# Patient Record
Sex: FEMALE | Race: ASIAN | NOT HISPANIC OR LATINO | URBAN - METROPOLITAN AREA
[De-identification: names, ages, dates, MRNs, and addresses within clinical notes are randomized per-mention and may not be internally consistent; named-entity substitution may affect disease eponyms.]

---

## 2023-09-29 ENCOUNTER — EMERGENCY (EMERGENCY)
Facility: HOSPITAL | Age: 58
LOS: 1 days | Discharge: HOME | End: 2023-09-29
Attending: STUDENT IN AN ORGANIZED HEALTH CARE EDUCATION/TRAINING PROGRAM | Admitting: STUDENT IN AN ORGANIZED HEALTH CARE EDUCATION/TRAINING PROGRAM
Payer: MEDICAID

## 2023-09-29 VITALS
RESPIRATION RATE: 16 BRPM | SYSTOLIC BLOOD PRESSURE: 126 MMHG | DIASTOLIC BLOOD PRESSURE: 61 MMHG | TEMPERATURE: 98 F | OXYGEN SATURATION: 100 % | HEART RATE: 69 BPM

## 2023-09-29 PROCEDURE — 99284 EMERGENCY DEPT VISIT MOD MDM: CPT

## 2023-09-29 RX ORDER — ACETAMINOPHEN 500 MG
1000 TABLET ORAL ONCE
Refills: 0 | Status: COMPLETED | OUTPATIENT
Start: 2023-09-29 | End: 2023-09-29

## 2023-09-29 RX ORDER — SODIUM CHLORIDE 9 MG/ML
1000 INJECTION INTRAMUSCULAR; INTRAVENOUS; SUBCUTANEOUS ONCE
Refills: 0 | Status: COMPLETED | OUTPATIENT
Start: 2023-09-29 | End: 2023-09-29

## 2023-09-29 RX ORDER — METOCLOPRAMIDE HCL 10 MG
10 TABLET ORAL ONCE
Refills: 0 | Status: COMPLETED | OUTPATIENT
Start: 2023-09-29 | End: 2023-09-29

## 2023-09-29 RX ADMIN — SODIUM CHLORIDE 1000 MILLILITER(S): 9 INJECTION INTRAMUSCULAR; INTRAVENOUS; SUBCUTANEOUS at 14:31

## 2023-09-29 RX ADMIN — Medication 10 MILLIGRAM(S): at 14:31

## 2023-09-29 RX ADMIN — Medication 400 MILLIGRAM(S): at 14:31

## 2023-09-29 NOTE — ED PROVIDER NOTE - NS ED ATTENDING STATEMENT MOD
This was a shared visit with the NURA. I reviewed and verified the documentation and independently performed the documented:

## 2023-09-29 NOTE — ED PROVIDER NOTE - PATIENT PORTAL LINK FT
You can access the FollowMyHealth Patient Portal offered by [facility] by registering at the following website: [email or web address]. By joining [employer]’s FollowMyHealth portal, you will also be able to view your health information using other applications (apps) compatible with our system.

## 2023-09-29 NOTE — ED ADULT NURSE NOTE - OBJECTIVE STATEMENT
Patient came in with the complaints of Headache. As per Daughter at bedside, patient took Motrin with little relief. No other complaints. Medications given as ordered. Patient tolerated well. Nursing care continues

## 2023-09-29 NOTE — ED PROVIDER NOTE - CLINICAL SUMMARY MEDICAL DECISION MAKING FREE TEXT BOX
58 yo F presents for HA. stable VS and benign neuro exam with no other current complaints. Tylenol, fluids, and Reglan and reassess. DC home with neuro f/u. Corrine Sanchez DO PGY1

## 2023-09-29 NOTE — ED PROVIDER NOTE - ATTENDING APP SHARED VISIT CONTRIBUTION OF CARE
I ([place]) agree with above, I performed a history and physical. Counseled xiomy medical staff, physician assistant, and/or medical student on medical decision making as documented. Medical decisions and treatment interventions were made in real time during the patient encounter. Additionally and/or with the following exceptions: 57-year-old past medical history hypertension hyperlipidemia who is presenting to the emergency department with headache.  Started yesterday.  Has had this headache before.  Patient is neurologically intact 5 out of 5 in all extremities, well-appearing, no nuchal rigidity, ambulatory without ataxia.  Patient felt better after acetaminophen and Reglan.  Was stable for DC home with neurology follow-up

## 2023-09-29 NOTE — ED PROVIDER NOTE - PHYSICAL EXAMINATION
Corrine Sanchez DO (PGY1)   Physical Exam:    Gen: NAD, AOx3, non-toxic appearing, able to ambulate without assistance  Lung: CTAB, no respiratory distress, no wheezes/rhonchi/rales B/L  CV: RRR, no murmurs, rubs or gallops  Abd: soft, NT, ND, no guarding, no rigidity, no rebound tenderness, no CVA tenderness   Neuro: CN2-12 grossly intact, A&Ox4, MS +5/5 in UE and LE BL, finger to nose smooth and rapid, gross sensation intact in UE and LE BL, gait smooth and coordinated

## 2023-09-29 NOTE — ED ADULT NURSE NOTE - NSFALLUNIVINTERV_ED_ALL_ED
Bed/Stretcher in lowest position, wheels locked, appropriate side rails in place/Call bell, personal items and telephone in reach/Instruct patient to call for assistance before getting out of bed/chair/stretcher/Non-slip footwear applied when patient is off stretcher/[place] to call system/Physically safe environment - no spills, clutter or unnecessary equipment/Purposeful proactive rounding/Room/bathroom lighting operational, light cord in reach

## 2023-09-29 NOTE — ED PROVIDER NOTE - OBJECTIVE STATEMENT
56 yo F past medical history of HTN and HDL presenting for nausea, vomiting nonbloody nonbilious and tension HA that began yesterday evening. Patient finished dinner and began vomiting, after which her HA developed. Too ibuprofen this morning which improved her symptoms. No current nausea, vomiting. Denies fever, chills, vision changes, numbness, tingling, weakness, diarrhea, constipation recent cough, recent sick contacts, recent trauma, abdominal pain, hematuria, dysuria, or any other symptoms at this time.  History obtained through [facility]  [id number]  Corrine Sanchez DO PGY1

## 2023-09-29 NOTE — ED PROVIDER NOTE - NSFOLLOWUPINSTRUCTIONS_ED_ALL_ED_FT
You came to the emergency room for headache. We performed a neurologic exam which was normal. You were given fluids and pain medication and your symptoms improved.     Please take ibuprofen and Tylenol as needed for pain. Please return to the emergency department if you experience nausea, vomiting that prevents you from keeping fluids down, new or worsening pain, vision changes, numbness, tingling, weakness, or are otherwise worried. Please follow up with your primary care doctor in 2-3 days to ensure you are getting better.
